# Patient Record
Sex: FEMALE | Race: WHITE | NOT HISPANIC OR LATINO | Employment: UNEMPLOYED | URBAN - METROPOLITAN AREA
[De-identification: names, ages, dates, MRNs, and addresses within clinical notes are randomized per-mention and may not be internally consistent; named-entity substitution may affect disease eponyms.]

---

## 2022-08-16 ENCOUNTER — HOSPITAL ENCOUNTER (EMERGENCY)
Facility: MEDICAL CENTER | Age: 57
End: 2022-08-16
Attending: EMERGENCY MEDICINE

## 2022-08-16 VITALS
HEIGHT: 63 IN | WEIGHT: 130.07 LBS | TEMPERATURE: 99.8 F | OXYGEN SATURATION: 93 % | HEART RATE: 66 BPM | SYSTOLIC BLOOD PRESSURE: 127 MMHG | RESPIRATION RATE: 20 BRPM | BODY MASS INDEX: 23.05 KG/M2 | DIASTOLIC BLOOD PRESSURE: 66 MMHG

## 2022-08-16 DIAGNOSIS — G43.909 MIGRAINE WITHOUT STATUS MIGRAINOSUS, NOT INTRACTABLE, UNSPECIFIED MIGRAINE TYPE: ICD-10-CM

## 2022-08-16 PROCEDURE — 96374 THER/PROPH/DIAG INJ IV PUSH: CPT

## 2022-08-16 PROCEDURE — 96375 TX/PRO/DX INJ NEW DRUG ADDON: CPT

## 2022-08-16 PROCEDURE — 99284 EMERGENCY DEPT VISIT MOD MDM: CPT

## 2022-08-16 PROCEDURE — 700105 HCHG RX REV CODE 258: Performed by: EMERGENCY MEDICINE

## 2022-08-16 PROCEDURE — 700111 HCHG RX REV CODE 636 W/ 250 OVERRIDE (IP): Performed by: EMERGENCY MEDICINE

## 2022-08-16 RX ORDER — KETOROLAC TROMETHAMINE 30 MG/ML
30 INJECTION, SOLUTION INTRAMUSCULAR; INTRAVENOUS ONCE
Status: COMPLETED | OUTPATIENT
Start: 2022-08-16 | End: 2022-08-16

## 2022-08-16 RX ORDER — SODIUM CHLORIDE 9 MG/ML
1000 INJECTION, SOLUTION INTRAVENOUS ONCE
Status: COMPLETED | OUTPATIENT
Start: 2022-08-16 | End: 2022-08-16

## 2022-08-16 RX ORDER — DIPHENHYDRAMINE HYDROCHLORIDE 50 MG/ML
25 INJECTION INTRAMUSCULAR; INTRAVENOUS ONCE
Status: COMPLETED | OUTPATIENT
Start: 2022-08-16 | End: 2022-08-16

## 2022-08-16 RX ORDER — PROCHLORPERAZINE EDISYLATE 5 MG/ML
10 INJECTION INTRAMUSCULAR; INTRAVENOUS ONCE
Status: COMPLETED | OUTPATIENT
Start: 2022-08-16 | End: 2022-08-16

## 2022-08-16 RX ORDER — DEXAMETHASONE SODIUM PHOSPHATE 4 MG/ML
12 INJECTION, SOLUTION INTRA-ARTICULAR; INTRALESIONAL; INTRAMUSCULAR; INTRAVENOUS; SOFT TISSUE EVERY 6 HOURS
Status: DISCONTINUED | OUTPATIENT
Start: 2022-08-16 | End: 2022-08-16 | Stop reason: HOSPADM

## 2022-08-16 RX ADMIN — DEXAMETHASONE SODIUM PHOSPHATE 12 MG: 4 INJECTION, SOLUTION INTRA-ARTICULAR; INTRALESIONAL; INTRAMUSCULAR; INTRAVENOUS; SOFT TISSUE at 18:11

## 2022-08-16 RX ADMIN — SODIUM CHLORIDE 1000 ML: 9 INJECTION, SOLUTION INTRAVENOUS at 18:08

## 2022-08-16 RX ADMIN — DIPHENHYDRAMINE HYDROCHLORIDE 25 MG: 50 INJECTION INTRAMUSCULAR; INTRAVENOUS at 18:08

## 2022-08-16 RX ADMIN — PROCHLORPERAZINE EDISYLATE 10 MG: 5 INJECTION INTRAMUSCULAR; INTRAVENOUS at 18:10

## 2022-08-16 RX ADMIN — KETOROLAC TROMETHAMINE 30 MG: 30 INJECTION, SOLUTION INTRAMUSCULAR at 18:09

## 2022-08-16 ASSESSMENT — ENCOUNTER SYMPTOMS
VOMITING: 1
COUGH: 0
FOCAL WEAKNESS: 0
HEADACHES: 1
FALLS: 0
TINGLING: 1
FEVER: 0
SHORTNESS OF BREATH: 0
ABDOMINAL PAIN: 0
NAUSEA: 1
DIARRHEA: 0

## 2022-08-17 NOTE — ED PROVIDER NOTES
ED Provider Note    ED Provider Note    Primary care provider: No primary care provider on file.  Means of arrival: POV  History obtained from: patient, friend  History limited by: None    CHIEF COMPLAINT  Chief Complaint   Patient presents with    Migraine       HPI  Jen Suggs is a 57 y.o. female who presents to the Emergency Department accompanied by a friend with a chief complaint of a migraine headache.  She has had migraines for many years.  Typically she takes Excedrin although she admits, that they are not well controlled with Excedrin.  She admits that she is seen frequently in her local emergency department for headaches.  She gets migraines at least every month and sometimes every few weeks.  This particular headache started last night.  Patient does admit that she has been traveling a lot.  She lives in [place] but she has been to [place] and not to [place] for various reasons and feels out of sorts.  She denies a fever.  No cough or cold symptoms.  No chest pain or shortness of breath.  No visual changes.  No urinary changes.  No trauma or falls.  She does frequently get some numbing of her fingertips with her headaches which she is experiencing now and she has had in the past.  She has no motor deficits, which is not typically something she experiences with her chronic headaches.  She also has associated nausea and vomiting which is typical of her migraines.  She has photosensitivity and noise sensitivity again, typical of her chronic migraines.  She does not drink or use drugs.  She does smoke cigarettes.    REVIEW OF SYSTEMS  Review of Systems   Constitutional:  Negative for fever.   HENT:  Negative for congestion.    Respiratory:  Negative for cough and shortness of breath.    Cardiovascular:  Negative for chest pain.   Gastrointestinal:  Positive for nausea and vomiting. Negative for abdominal pain and diarrhea.   Genitourinary:  Negative for dysuria.   Musculoskeletal:  Negative for falls.  "  Neurological:  Positive for tingling and headaches. Negative for focal weakness.   All other systems reviewed and are negative.    PAST MEDICAL HISTORY  Migraine headaches    SURGICAL HISTORY  patient denies any surgical history    SOCIAL HISTORY  Social History     Tobacco Use    Smoking status: Every Day     Packs/day: 0.50     Types: Cigarettes    Smokeless tobacco: Never   Substance Use Topics    Alcohol use: Never    Drug use: Never      Social History     Substance and Sexual Activity   Drug Use Never       FAMILY HISTORY  History reviewed. No pertinent family history.    CURRENT MEDICATIONS  Home Medications       Reviewed by Nicolasa Castellanos R.N. (Registered Nurse) on 08/16/22 at 1728  Med List Status: Complete     Medication Last Dose Status        Patient Gamal Taking any Medications                           ALLERGIES  No Known Allergies    PHYSICAL EXAM  VITAL SIGNS: /66   Pulse 66   Temp 37.7 °C (99.8 °F) (Temporal)   Resp 20   Ht 1.6 m (5' 3\")   Wt 59 kg (130 lb 1.1 oz)   SpO2 93%   BMI 23.04 kg/m²   Vitals reviewed.  Constitutional: Patient is oriented to person, place, and time. Appears well-developed and well-nourished.  Moderate distress.    Head: Normocephalic and atraumatic.   Ears: Normal external ears bilaterally.   Mouth/Throat: Oropharynx is clear and moist, no exudates.  Patient is a dentulous  Eyes: Conjunctivae are normal. Pupils are equal, round, and reactive to light.   Neck: Normal range of motion. Neck supple.  No meningeal signs.  Cardiovascular: Normal rate, regular rhythm and normal heart sounds. Normal peripheral pulses.  Pulmonary/Chest: Effort normal and breath sounds normal. No respiratory distress, no wheezes, rhonchi, or rales.  Abdominal: Soft. Bowel sounds are normal. There is no tenderness. No rebound or guarding, or peritoneal signs.  Musculoskeletal: No edema and no tenderness.  Neurological: No focal deficits.  CN II through XII intact.  Normal speech.  " Normal cognition.  Skin: Skin is warm and dry. No erythema. No pallor.   Psychiatric: Patient has a normal mood and affect.     COURSE & MEDICAL DECISION MAKING  Pertinent Labs & Imaging studies reviewed. (See chart for details)    No prior encounters in our EMR.    5:46 PM - Patient seen and examined at bedside.  This is a pleasant, 57-year-old female.  She is visiting from out of town.  She has been traveling a lot lately.  She has a long history of migraine headaches that she has every 2 to 4 weeks.  Symptoms that she is experiencing now, are typical of her chronic headaches.  She has reassuring vital signs.  No new neurologic deficits.  I have advised IV start, IV fluids, anti-inflammatories, steroids, antiemetics and Benadryl.    7:35PM patient is reevaluated at the bedside.  She has been up and ambulated to the bathroom.  She reports marked improvement after treatment with IV fluids and additional therapies mentioned above.  She reports complete resolution of her headache.  She is advised to get plenty of rest, drink plenty of fluids.  I feel at this time, that she can safely be discharged home and she agrees.  She is well-appearing and nontoxic with reassuring vital signs.  She is discharged home in stable condition with strict return precautions.    FINAL IMPRESSION  1. Migraine without status migrainosus, not intractable, unspecified migraine type

## 2022-08-17 NOTE — ED NOTES
Pt laying quietly in gurney. Pt stated headache is better with medications given. Pt is in no apparent distress. Breathing is non-labored with stable VS in the monitor.

## 2022-08-17 NOTE — ED NOTES
Assumed care at this time.    Patient resting in [facility]. No signs of distress noted. Equal chest rise and fall. No further needs at this time. Call light within reach.

## 2022-08-17 NOTE — ED TRIAGE NOTES
Pt ambulatory to triage c/o migraine that began last night and not relieved with medication. Pt states hx of same. Pt states having nausea no vomiting. Pt appears moderately distressed.